# Patient Record
Sex: FEMALE | Race: WHITE
[De-identification: names, ages, dates, MRNs, and addresses within clinical notes are randomized per-mention and may not be internally consistent; named-entity substitution may affect disease eponyms.]

---
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
## 2024-02-08 ENCOUNTER — HOSPITAL ENCOUNTER (EMERGENCY)
Age: 24
Discharge: HOME | End: 2024-02-08
Payer: MEDICAID

## 2024-02-08 VITALS
SYSTOLIC BLOOD PRESSURE: 114 MMHG | TEMPERATURE: 97.9 F | DIASTOLIC BLOOD PRESSURE: 57 MMHG | RESPIRATION RATE: 16 BRPM | HEART RATE: 80 BPM | OXYGEN SATURATION: 100 %

## 2024-02-08 VITALS — BODY MASS INDEX: 21.9 KG/M2

## 2024-02-08 DIAGNOSIS — Z3A.00: ICD-10-CM

## 2024-02-08 DIAGNOSIS — O99.330: ICD-10-CM

## 2024-02-08 DIAGNOSIS — F17.200: ICD-10-CM

## 2024-02-08 DIAGNOSIS — O23.40: Primary | ICD-10-CM

## 2024-02-08 LAB
INTERNAL QC VALIDATED?: (no result)
LEUKOCYTE ESTERASE UR QL STRIP: 25 /UL
MUCOUS THREADS URNS QL MICRO: (no result) /HPF
PROT UR QL STRIP.AUTO: 15 MG/DL
RBC UR QL: (no result) /HPF (ref 0–5)
RBC UR QL: 10 /UL
RECORD KIT LOT#,URINE PREG: (no result)
SP GR UR: 1.02 (ref 1–1.03)
SQUAMOUS URNS QL MICRO: (no result) /HPF (ref 5–10)
URINE PRESERVATIVE: (no result)

## 2024-02-08 PROCEDURE — 99282 EMERGENCY DEPT VISIT SF MDM: CPT

## 2024-02-08 PROCEDURE — 87088 URINE BACTERIA CULTURE: CPT

## 2024-02-08 PROCEDURE — 87186 SC STD MICRODIL/AGAR DIL: CPT

## 2024-02-08 PROCEDURE — 81001 URINALYSIS AUTO W/SCOPE: CPT

## 2024-02-08 PROCEDURE — 81025 URINE PREGNANCY TEST: CPT

## 2024-02-08 PROCEDURE — 87086 URINE CULTURE/COLONY COUNT: CPT

## 2024-04-29 ENCOUNTER — HOSPITAL ENCOUNTER (EMERGENCY)
Age: 24
Discharge: HOME | End: 2024-04-29
Payer: MEDICAID

## 2024-04-29 VITALS
TEMPERATURE: 98 F | DIASTOLIC BLOOD PRESSURE: 78 MMHG | RESPIRATION RATE: 14 BRPM | SYSTOLIC BLOOD PRESSURE: 117 MMHG | HEART RATE: 62 BPM | BODY MASS INDEX: 23.2 KG/M2 | OXYGEN SATURATION: 100 %

## 2024-04-29 VITALS
HEART RATE: 88 BPM | RESPIRATION RATE: 18 BRPM | SYSTOLIC BLOOD PRESSURE: 112 MMHG | DIASTOLIC BLOOD PRESSURE: 68 MMHG | OXYGEN SATURATION: 98 %

## 2024-04-29 VITALS
DIASTOLIC BLOOD PRESSURE: 62 MMHG | RESPIRATION RATE: 16 BRPM | SYSTOLIC BLOOD PRESSURE: 106 MMHG | OXYGEN SATURATION: 97 % | HEART RATE: 100 BPM

## 2024-04-29 DIAGNOSIS — Z87.891: ICD-10-CM

## 2024-04-29 DIAGNOSIS — Z3A.20: ICD-10-CM

## 2024-04-29 DIAGNOSIS — O20.0: Primary | ICD-10-CM

## 2024-04-29 LAB
DEPRECATED RDW RBC: 40.3 FL (ref 35.1–43.9)
ERYTHROCYTE [DISTWIDTH] IN BLOOD: 12.4 % (ref 11.6–14.6)
HCG SERPL QL: 6475 MIU/ML (ref 1–3)
HCT VFR BLD AUTO: 30.6 % (ref 37–47)
HGB BLD-MCNC: 10.1 G/DL (ref 12–15)
IMMATURE GRANULOCYTES COUNT: 0.09 X10^3/UL (ref 0–0)
MCV RBC: 88.7 FL (ref 81–99)
MEAN CORP HGB CONC: 33 G/DL (ref 32–36)
MEAN PLATELET VOL.: 9.2 FL (ref 6.2–12)
MUCOUS THREADS URNS QL MICRO: (no result) /HPF
NRBC FLAGGED BY ANALYZER: 0 % (ref 0–5)
PLATELET # BLD: 279 K/MM3 (ref 150–450)
RBC # BLD AUTO: 3.45 M/MM3 (ref 4.2–5.4)
RBC UR QL: (no result) /HPF (ref 0–5)
RBC UR QL: 25 /UL
SP GR UR: 1 (ref 1–1.03)
SQUAMOUS URNS QL MICRO: (no result) /HPF (ref 5–10)
URINE PRESERVATIVE: (no result)
WBC # BLD AUTO: 10.1 K/MM3 (ref 4.4–11)

## 2024-04-29 PROCEDURE — 76816 OB US FOLLOW-UP PER FETUS: CPT

## 2024-04-29 PROCEDURE — 99282 EMERGENCY DEPT VISIT SF MDM: CPT

## 2024-04-29 PROCEDURE — 81001 URINALYSIS AUTO W/SCOPE: CPT

## 2024-04-29 PROCEDURE — 87186 SC STD MICRODIL/AGAR DIL: CPT

## 2024-04-29 PROCEDURE — 86900 BLOOD TYPING SEROLOGIC ABO: CPT

## 2024-04-29 PROCEDURE — 87088 URINE BACTERIA CULTURE: CPT

## 2024-04-29 PROCEDURE — 85025 COMPLETE CBC W/AUTO DIFF WBC: CPT

## 2024-04-29 PROCEDURE — 87086 URINE CULTURE/COLONY COUNT: CPT

## 2024-04-29 PROCEDURE — 84702 CHORIONIC GONADOTROPIN TEST: CPT

## 2024-04-29 PROCEDURE — A4216 STERILE WATER/SALINE, 10 ML: HCPCS

## 2024-04-29 PROCEDURE — 86901 BLOOD TYPING SEROLOGIC RH(D): CPT

## 2024-07-08 ENCOUNTER — HOSPITAL ENCOUNTER (EMERGENCY)
Facility: HOSPITAL | Age: 24
Discharge: LEFT BEFORE DISCHARGE | End: 2024-07-08
Payer: MEDICAID

## 2024-07-08 VITALS
DIASTOLIC BLOOD PRESSURE: 63 MMHG | SYSTOLIC BLOOD PRESSURE: 103 MMHG | OXYGEN SATURATION: 98 % | RESPIRATION RATE: 16 BRPM | BODY MASS INDEX: 23.04 KG/M2 | WEIGHT: 130 LBS | TEMPERATURE: 97.9 F | HEART RATE: 97 BPM | HEIGHT: 63 IN

## 2024-07-08 DIAGNOSIS — O26.899 VAGINAL DISCHARGE DURING PREGNANCY, ANTEPARTUM (HHS-HCC): Primary | ICD-10-CM

## 2024-07-08 DIAGNOSIS — Z91.199 NONCOMPLIANCE: ICD-10-CM

## 2024-07-08 DIAGNOSIS — Z53.29 LEFT AGAINST MEDICAL ADVICE: ICD-10-CM

## 2024-07-08 DIAGNOSIS — N89.8 VAGINAL DISCHARGE DURING PREGNANCY, ANTEPARTUM (HHS-HCC): Primary | ICD-10-CM

## 2024-07-08 LAB
ALBUMIN SERPL BCP-MCNC: 3 G/DL (ref 3.4–5)
ALP SERPL-CCNC: 145 U/L (ref 33–110)
ALT SERPL W P-5'-P-CCNC: 16 U/L (ref 7–45)
ANION GAP SERPL CALC-SCNC: 11 MMOL/L (ref 10–20)
AST SERPL W P-5'-P-CCNC: 15 U/L (ref 9–39)
BASOPHILS # BLD AUTO: 0.04 X10*3/UL (ref 0–0.1)
BASOPHILS NFR BLD AUTO: 0.4 %
BILIRUB SERPL-MCNC: 0.4 MG/DL (ref 0–1.2)
BUN SERPL-MCNC: 13 MG/DL (ref 6–23)
CALCIUM SERPL-MCNC: 8.3 MG/DL (ref 8.6–10.3)
CHLORIDE SERPL-SCNC: 103 MMOL/L (ref 98–107)
CO2 SERPL-SCNC: 26 MMOL/L (ref 21–32)
CREAT SERPL-MCNC: 0.7 MG/DL (ref 0.5–1.05)
EGFRCR SERPLBLD CKD-EPI 2021: >90 ML/MIN/1.73M*2
EOSINOPHIL # BLD AUTO: 0.08 X10*3/UL (ref 0–0.7)
EOSINOPHIL NFR BLD AUTO: 0.8 %
ERYTHROCYTE [DISTWIDTH] IN BLOOD BY AUTOMATED COUNT: 13.8 % (ref 11.5–14.5)
GLUCOSE SERPL-MCNC: 84 MG/DL (ref 74–99)
HCT VFR BLD AUTO: 28.5 % (ref 36–46)
HGB BLD-MCNC: 9.1 G/DL (ref 12–16)
IMM GRANULOCYTES # BLD AUTO: 0.08 X10*3/UL (ref 0–0.7)
IMM GRANULOCYTES NFR BLD AUTO: 0.8 % (ref 0–0.9)
LYMPHOCYTES # BLD AUTO: 1.24 X10*3/UL (ref 1.2–4.8)
LYMPHOCYTES NFR BLD AUTO: 12.7 %
MCH RBC QN AUTO: 25.9 PG (ref 26–34)
MCHC RBC AUTO-ENTMCNC: 31.9 G/DL (ref 32–36)
MCV RBC AUTO: 81 FL (ref 80–100)
MONOCYTES # BLD AUTO: 0.47 X10*3/UL (ref 0.1–1)
MONOCYTES NFR BLD AUTO: 4.8 %
NEUTROPHILS # BLD AUTO: 7.85 X10*3/UL (ref 1.2–7.7)
NEUTROPHILS NFR BLD AUTO: 80.5 %
NRBC BLD-RTO: 0 /100 WBCS (ref 0–0)
PLATELET # BLD AUTO: 284 X10*3/UL (ref 150–450)
POTASSIUM SERPL-SCNC: 3.7 MMOL/L (ref 3.5–5.3)
PROT SERPL-MCNC: 5.9 G/DL (ref 6.4–8.2)
RBC # BLD AUTO: 3.52 X10*6/UL (ref 4–5.2)
SODIUM SERPL-SCNC: 136 MMOL/L (ref 136–145)
WBC # BLD AUTO: 9.8 X10*3/UL (ref 4.4–11.3)

## 2024-07-08 PROCEDURE — 80053 COMPREHEN METABOLIC PANEL: CPT | Performed by: PHYSICIAN ASSISTANT

## 2024-07-08 PROCEDURE — 36415 COLL VENOUS BLD VENIPUNCTURE: CPT | Performed by: PHYSICIAN ASSISTANT

## 2024-07-08 PROCEDURE — 85025 COMPLETE CBC W/AUTO DIFF WBC: CPT | Performed by: PHYSICIAN ASSISTANT

## 2024-07-08 PROCEDURE — 99283 EMERGENCY DEPT VISIT LOW MDM: CPT

## 2024-07-08 ASSESSMENT — ENCOUNTER SYMPTOMS
FEVER: 0
CHILLS: 0
SHORTNESS OF BREATH: 0
ARTHRALGIAS: 0
DYSURIA: 0
EYE PAIN: 0
COUGH: 0
ABDOMINAL PAIN: 0
VOMITING: 0
SORE THROAT: 0
HEMATURIA: 0
COLOR CHANGE: 0
SEIZURES: 0
BACK PAIN: 0
PALPITATIONS: 0

## 2024-07-08 ASSESSMENT — COLUMBIA-SUICIDE SEVERITY RATING SCALE - C-SSRS
6. HAVE YOU EVER DONE ANYTHING, STARTED TO DO ANYTHING, OR PREPARED TO DO ANYTHING TO END YOUR LIFE?: NO
2. HAVE YOU ACTUALLY HAD ANY THOUGHTS OF KILLING YOURSELF?: NO
1. IN THE PAST MONTH, HAVE YOU WISHED YOU WERE DEAD OR WISHED YOU COULD GO TO SLEEP AND NOT WAKE UP?: NO

## 2024-07-08 NOTE — ED PROVIDER NOTES
Patient is a 24-year-old female who presents to the emergency room with a pregnancy issue.  Patient reports that she is pregnant.  She does not know her estimated date of delivery or how many weeks she is.  She states that her last menstrual cycle was in December at some point.  She was unable to provide a date.  She states that she had 2 OB appointments but missed both of her appointments as she did not have a car.  She reports that 3 days ago she developed lower abdominal pain, had a blood clot and has since then has been leaking fluid that she reports is clear.  She denies any additional bleeding or abdominal pain since the initial blood clot. This is her 4th pregnancy. No fever or chills. Denies trauma.           Review of Systems   Constitutional:  Negative for chills and fever.   HENT:  Negative for ear pain and sore throat.    Eyes:  Negative for pain and visual disturbance.   Respiratory:  Negative for cough and shortness of breath.    Cardiovascular:  Negative for chest pain and palpitations.   Gastrointestinal:  Negative for abdominal pain and vomiting.   Genitourinary:  Positive for vaginal discharge. Negative for dysuria, hematuria and vaginal bleeding.   Musculoskeletal:  Negative for arthralgias and back pain.   Skin:  Negative for color change and rash.   Neurological:  Negative for seizures and syncope.   All other systems reviewed and are negative.       Physical Exam  Vitals and nursing note reviewed.   Constitutional:       General: She is not in acute distress.     Appearance: Normal appearance. She is well-developed.   HENT:      Head: Normocephalic and atraumatic.   Eyes:      Extraocular Movements: Extraocular movements intact.      Conjunctiva/sclera: Conjunctivae normal.   Cardiovascular:      Rate and Rhythm: Normal rate and regular rhythm.      Heart sounds: No murmur heard.  Pulmonary:      Effort: Pulmonary effort is normal. No respiratory distress.      Breath sounds: Normal breath  sounds. No stridor. No wheezing or rhonchi.   Abdominal:      General: There is distension.      Palpations: Abdomen is soft. There is no mass.      Tenderness: There is no abdominal tenderness. There is no guarding or rebound.      Hernia: No hernia is present.   Genitourinary:     Vagina: Vaginal discharge (white milky discharge) present.      Comments: Cervical os is closed  Musculoskeletal:         General: No swelling.      Cervical back: Normal range of motion and neck supple. No rigidity.   Skin:     General: Skin is warm and dry.      Capillary Refill: Capillary refill takes less than 2 seconds.   Neurological:      Mental Status: She is alert.   Psychiatric:         Mood and Affect: Mood normal.          Labs Reviewed   CBC WITH AUTO DIFFERENTIAL - Abnormal       Result Value    WBC 9.8      nRBC 0.0      RBC 3.52 (*)     Hemoglobin 9.1 (*)     Hematocrit 28.5 (*)     MCV 81      MCH 25.9 (*)     MCHC 31.9 (*)     RDW 13.8      Platelets 284      Neutrophils % 80.5      Immature Granulocytes %, Automated 0.8      Lymphocytes % 12.7      Monocytes % 4.8      Eosinophils % 0.8      Basophils % 0.4      Neutrophils Absolute 7.85 (*)     Immature Granulocytes Absolute, Automated 0.08      Lymphocytes Absolute 1.24      Monocytes Absolute 0.47      Eosinophils Absolute 0.08      Basophils Absolute 0.04     COMPREHENSIVE METABOLIC PANEL - Abnormal    Glucose 84      Sodium 136      Potassium 3.7      Chloride 103      Bicarbonate 26      Anion Gap 11      Urea Nitrogen 13      Creatinine 0.70      eGFR >90      Calcium 8.3 (*)     Albumin 3.0 (*)     Alkaline Phosphatase 145 (*)     Total Protein 5.9 (*)     AST 15      Bilirubin, Total 0.4      ALT 16          No orders to display        Procedures     Medical Decision Making  Patient is a 24-year-old female who presents to the emergency room with vaginal discharge in pregnancy.  Patient is a very poor historian.  States that her last menstrual cycle was in  December, does not know how far along she is in her pregnancy.  She has not received any prenatal care.  Reports that she was scheduled to see OB on 2 different occasions but was unable to make her appointment secondary to not having a vehicle.  She does report recently that she was seen at [facility] in which she had an ultrasound.  I did review the patient's previous encounters through epic and I do not appreciate any recent visits or any record of any ultrasounds.  Pelvic examination was performed and there is a white milky discharge noted in her vaginal vault.  Cervical os is closed.  No appreciated vaginal bleeding.  She denies any discharge.  Her report of vaginal bleeding happened 3 days ago in which she states that she has had discharge since then that she states is clear.  I do not have the capability to test for any amniotic fluid.  Fetal heart tones were 168.  Given the estimated date of delivery based on patient's last known menstrual cycle she is greater than 20 weeks gestation.  Given that she has had no prenatal care and is an extremely poor historian with report of bleeding and a brief episode of abdominal pain that has resolved 3 days ago with leakage of clear fluid she is recommended for further evaluation by obstetrics.  We do not have obstetrics at this hospital.  I spoke with [facility] as we do not have would be at this facility.  I spoke with obstetrician, Dr. Sanchez.  He has accepted the patient for transfer.  Will go through the emergency room and will then go up to OB.  I discussed this with the patient as the nurse was drawing blood and starting an IV.  Patient is recommended for transfer via squad as she has received no prenatal care, seems somewhat unreliable to get herself to [facility].  This was discussed with patient that she was excepted at [place], she would be transferred via squad.  Patient was originally agreeable and then further changed her  mind.  She first asked if she would be able to have a cigarette before she left which I told her no.  She then states that her phone was dying and she would like to leave as she was scared and would like to go home and get her mother.  She asked if we thought she should go to [place] and I responded that yes there was a concern and she needed further evaluated.  Explained that given no prenatal care and fetus needed further evaluation with OB.  Patient stated that she wanted to go home.  She signed out AGAINST MEDICAL ADVICE.  Discussed the risk of signing out AGAINST MEDICAL ADVICE including worsening condition, disability, pregnancy related issue, death.  Patient verbalized understanding.  Strongly encourage patient to present to a facility with OB as soon as possible.         Diagnoses as of 07/08/24 2220   Vaginal discharge during pregnancy, antepartum ([facility])   Noncompliance   Left against medical advice                    Funmilayo Celaya PA-C  07/08/24 2220

## 2024-07-14 ENCOUNTER — HOSPITAL ENCOUNTER (INPATIENT)
Dept: HOSPITAL 100 - WPOUT | Age: 24
LOS: 2 days | Discharge: HOME | DRG: 540 | End: 2024-07-16
Payer: MEDICAID

## 2024-07-14 ENCOUNTER — TELEPHONE (OUTPATIENT)
Dept: EMERGENCY MEDICINE | Facility: HOSPITAL | Age: 24
End: 2024-07-14

## 2024-07-14 VITALS
TEMPERATURE: 96.4 F | SYSTOLIC BLOOD PRESSURE: 116 MMHG | DIASTOLIC BLOOD PRESSURE: 71 MMHG | OXYGEN SATURATION: 100 % | HEART RATE: 92 BPM | RESPIRATION RATE: 16 BRPM

## 2024-07-14 VITALS
RESPIRATION RATE: 15 BRPM | OXYGEN SATURATION: 100 % | HEART RATE: 91 BPM | DIASTOLIC BLOOD PRESSURE: 72 MMHG | SYSTOLIC BLOOD PRESSURE: 116 MMHG

## 2024-07-14 VITALS
DIASTOLIC BLOOD PRESSURE: 67 MMHG | SYSTOLIC BLOOD PRESSURE: 107 MMHG | OXYGEN SATURATION: 99 % | RESPIRATION RATE: 14 BRPM | TEMPERATURE: 97.16 F | HEART RATE: 82 BPM

## 2024-07-14 VITALS
OXYGEN SATURATION: 100 % | HEART RATE: 90 BPM | SYSTOLIC BLOOD PRESSURE: 110 MMHG | DIASTOLIC BLOOD PRESSURE: 69 MMHG | RESPIRATION RATE: 16 BRPM | TEMPERATURE: 97.16 F

## 2024-07-14 VITALS
DIASTOLIC BLOOD PRESSURE: 81 MMHG | SYSTOLIC BLOOD PRESSURE: 122 MMHG | RESPIRATION RATE: 16 BRPM | TEMPERATURE: 97.52 F | HEART RATE: 92 BPM | OXYGEN SATURATION: 99 %

## 2024-07-14 VITALS
SYSTOLIC BLOOD PRESSURE: 108 MMHG | OXYGEN SATURATION: 100 % | TEMPERATURE: 98.06 F | HEART RATE: 98 BPM | RESPIRATION RATE: 16 BRPM | DIASTOLIC BLOOD PRESSURE: 68 MMHG

## 2024-07-14 VITALS
TEMPERATURE: 97.88 F | HEART RATE: 92 BPM | OXYGEN SATURATION: 98 % | DIASTOLIC BLOOD PRESSURE: 56 MMHG | RESPIRATION RATE: 15 BRPM | SYSTOLIC BLOOD PRESSURE: 103 MMHG

## 2024-07-14 VITALS
SYSTOLIC BLOOD PRESSURE: 99 MMHG | TEMPERATURE: 97.88 F | DIASTOLIC BLOOD PRESSURE: 65 MMHG | RESPIRATION RATE: 16 BRPM | OXYGEN SATURATION: 100 % | HEART RATE: 90 BPM

## 2024-07-14 VITALS — HEART RATE: 97 BPM | OXYGEN SATURATION: 100 %

## 2024-07-14 VITALS
SYSTOLIC BLOOD PRESSURE: 118 MMHG | HEART RATE: 98 BPM | OXYGEN SATURATION: 98 % | TEMPERATURE: 97.9 F | RESPIRATION RATE: 15 BRPM | DIASTOLIC BLOOD PRESSURE: 60 MMHG

## 2024-07-14 VITALS
DIASTOLIC BLOOD PRESSURE: 75 MMHG | SYSTOLIC BLOOD PRESSURE: 128 MMHG | RESPIRATION RATE: 15 BRPM | HEART RATE: 91 BPM | OXYGEN SATURATION: 100 %

## 2024-07-14 VITALS — OXYGEN SATURATION: 100 % | HEART RATE: 97 BPM

## 2024-07-14 VITALS
OXYGEN SATURATION: 100 % | DIASTOLIC BLOOD PRESSURE: 62 MMHG | HEART RATE: 90 BPM | RESPIRATION RATE: 15 BRPM | TEMPERATURE: 98 F | SYSTOLIC BLOOD PRESSURE: 108 MMHG

## 2024-07-14 VITALS
DIASTOLIC BLOOD PRESSURE: 75 MMHG | RESPIRATION RATE: 16 BRPM | TEMPERATURE: 96.62 F | SYSTOLIC BLOOD PRESSURE: 109 MMHG | HEART RATE: 86 BPM | OXYGEN SATURATION: 99 %

## 2024-07-14 VITALS
RESPIRATION RATE: 15 BRPM | DIASTOLIC BLOOD PRESSURE: 66 MMHG | TEMPERATURE: 96.1 F | HEART RATE: 81 BPM | SYSTOLIC BLOOD PRESSURE: 115 MMHG | OXYGEN SATURATION: 100 %

## 2024-07-14 VITALS
HEART RATE: 88 BPM | OXYGEN SATURATION: 97 % | TEMPERATURE: 97.52 F | RESPIRATION RATE: 16 BRPM | SYSTOLIC BLOOD PRESSURE: 109 MMHG | DIASTOLIC BLOOD PRESSURE: 55 MMHG

## 2024-07-14 VITALS — HEART RATE: 89 BPM | OXYGEN SATURATION: 100 %

## 2024-07-14 VITALS — HEART RATE: 102 BPM | OXYGEN SATURATION: 91 %

## 2024-07-14 DIAGNOSIS — Z3A.31: ICD-10-CM

## 2024-07-14 DIAGNOSIS — Z3A.28: ICD-10-CM

## 2024-07-14 DIAGNOSIS — F15.90: ICD-10-CM

## 2024-07-14 DIAGNOSIS — Z37.1: ICD-10-CM

## 2024-07-14 DIAGNOSIS — D62: ICD-10-CM

## 2024-07-14 DIAGNOSIS — O36.4XX0: ICD-10-CM

## 2024-07-14 LAB
AMPHET UR-SCNC: POSITIVE UMOL/L
BARBITURATE URINE VISTA: NEGATIVE
BENZODIAZEPINE URINE VISTA: NEGATIVE
COCAINE URINE VISTA: NEGATIVE
DEPRECATED RDW RBC: 40.9 FL (ref 35.1–43.9)
DIFFERENTIAL COMMENT: (no result)
DIFFERENTIAL INDICATED: (no result)
DRUG CONFIRMATION TO FOLLOW?: (no result)
ECSTACY URINE VISTA: POSITIVE
ERYTHROCYTE [DISTWIDTH] IN BLOOD: 14.1 % (ref 11.6–14.6)
HCT VFR BLD AUTO: 27.8 % (ref 37–47)
HEMOGLOBIN: 8.7 G/DL (ref 12–15)
HGB BLD-MCNC: 8.7 G/DL (ref 12–15)
IMMATURE GRANULOCYTES COUNT: 0.12 X10^3/UL (ref 0–0)
LEUKOCYTE ESTERASE UR QL STRIP: 25 /UL
MANUAL DIF COMMENT BLD-IMP: (no result)
MCV RBC: 80.1 FL (ref 81–99)
MEAN CORP HGB CONC: 31.3 G/DL (ref 32–36)
MEAN PLATELET VOL.: 9.5 FL (ref 6.2–12)
METHADONE URINE VISTA: NEGATIVE
MUCOUS THREADS URNS QL MICRO: (no result) /HPF
NRBC FLAGGED BY ANALYZER: 0 % (ref 0–5)
PCP UR QL: NEGATIVE
PH UR: 6 [PH]
PLATELET # BLD: 332 K/MM3 (ref 150–450)
PLATELET COUNT: 332 K/MM3 (ref 150–450)
POSITIVE DIFFERENTIAL: YES
PROT UR QL STRIP.AUTO: 30 MG/DL
RBC # BLD AUTO: 3.47 M/MM3 (ref 4.2–5.4)
RBC DISTRIBUTION WIDTH CV: 14.1 % (ref 11.6–14.6)
RBC DISTRIBUTION WIDTH SD: 40.9 FL (ref 35.1–43.9)
RBC UR QL: (no result) /HPF (ref 0–5)
RBC UR QL: 25 /UL
SCAN SMEAR PER REVIEW CRITERIA: (no result)
SP GR UR: 1.01 (ref 1–1.03)
SQUAMOUS URNS QL MICRO: (no result) /HPF (ref 5–10)
THC URINE VISTA: POSITIVE
URINE PRESERVATIVE: (no result)
WBC # BLD AUTO: 22.5 K/MM3 (ref 4.4–11)
WHITE BLOOD COUNT: 22.5 K/MM3 (ref 4.4–11)

## 2024-07-14 PROCEDURE — 86901 BLOOD TYPING SEROLOGIC RH(D): CPT

## 2024-07-14 PROCEDURE — 86780 TREPONEMA PALLIDUM: CPT

## 2024-07-14 PROCEDURE — 85027 COMPLETE CBC AUTOMATED: CPT

## 2024-07-14 PROCEDURE — 86900 BLOOD TYPING SEROLOGIC ABO: CPT

## 2024-07-14 PROCEDURE — 86762 RUBELLA ANTIBODY: CPT

## 2024-07-14 PROCEDURE — 87491 CHLMYD TRACH DNA AMP PROBE: CPT

## 2024-07-14 PROCEDURE — 99221 1ST HOSP IP/OBS SF/LOW 40: CPT

## 2024-07-14 PROCEDURE — 87340 HEPATITIS B SURFACE AG IA: CPT

## 2024-07-14 PROCEDURE — 85025 COMPLETE CBC W/AUTO DIFF WBC: CPT

## 2024-07-14 PROCEDURE — 87591 N.GONORRHOEAE DNA AMP PROB: CPT

## 2024-07-14 PROCEDURE — 81001 URINALYSIS AUTO W/SCOPE: CPT

## 2024-07-14 PROCEDURE — 86703 HIV-1/HIV-2 1 RESULT ANTBDY: CPT

## 2024-07-14 PROCEDURE — A4216 STERILE WATER/SALINE, 10 ML: HCPCS

## 2024-07-14 PROCEDURE — 88307 TISSUE EXAM BY PATHOLOGIST: CPT

## 2024-07-14 PROCEDURE — 86803 HEPATITIS C AB TEST: CPT

## 2024-07-14 PROCEDURE — 86850 RBC ANTIBODY SCREEN: CPT

## 2024-07-14 PROCEDURE — 80307 DRUG TEST PRSMV CHEM ANLYZR: CPT

## 2024-07-14 PROCEDURE — G0378 HOSPITAL OBSERVATION PER HR: HCPCS

## 2024-07-14 RX ADMIN — DOCUSATE SODIUM 50 MG AND SENNOSIDES 8.6 MG 0 TABLET: 8.6; 5 TABLET, FILM COATED ORAL at 12:14

## 2024-07-14 RX ADMIN — KETOROLAC TROMETHAMINE 30 MG: 30 INJECTION, SOLUTION INTRAMUSCULAR; INTRAVENOUS at 09:50

## 2024-07-14 RX ADMIN — KETOROLAC TROMETHAMINE 30 MG: 30 INJECTION, SOLUTION INTRAMUSCULAR; INTRAVENOUS at 21:49

## 2024-07-14 RX ADMIN — OXYTOCIN 83 UNITS: 10 INJECTION, SOLUTION INTRAMUSCULAR; INTRAVENOUS at 09:00

## 2024-07-14 RX ADMIN — SODIUM CHLORIDE, PRESERVATIVE FREE 0 ML: 5 INJECTION INTRAVENOUS at 09:52

## 2024-07-14 RX ADMIN — KETOROLAC TROMETHAMINE 30 MG: 30 INJECTION, SOLUTION INTRAMUSCULAR; INTRAVENOUS at 15:56

## 2024-07-14 RX ADMIN — CEFAZOLIN SODIUM 150 GM: 1 INJECTION, SOLUTION INTRAVENOUS at 15:44

## 2024-07-14 RX ADMIN — SODIUM CHLORIDE, PRESERVATIVE FREE 0 ML: 5 INJECTION INTRAVENOUS at 21:48

## 2024-07-15 VITALS
SYSTOLIC BLOOD PRESSURE: 93 MMHG | OXYGEN SATURATION: 98 % | RESPIRATION RATE: 18 BRPM | DIASTOLIC BLOOD PRESSURE: 84 MMHG | HEART RATE: 86 BPM | TEMPERATURE: 97.88 F

## 2024-07-15 VITALS
OXYGEN SATURATION: 98 % | RESPIRATION RATE: 15 BRPM | TEMPERATURE: 97 F | SYSTOLIC BLOOD PRESSURE: 108 MMHG | DIASTOLIC BLOOD PRESSURE: 58 MMHG | HEART RATE: 73 BPM

## 2024-07-15 VITALS
DIASTOLIC BLOOD PRESSURE: 66 MMHG | HEART RATE: 80 BPM | TEMPERATURE: 98 F | RESPIRATION RATE: 17 BRPM | SYSTOLIC BLOOD PRESSURE: 107 MMHG

## 2024-07-15 VITALS
SYSTOLIC BLOOD PRESSURE: 105 MMHG | HEART RATE: 84 BPM | TEMPERATURE: 98.9 F | DIASTOLIC BLOOD PRESSURE: 52 MMHG | OXYGEN SATURATION: 98 % | RESPIRATION RATE: 16 BRPM

## 2024-07-15 VITALS
OXYGEN SATURATION: 100 % | DIASTOLIC BLOOD PRESSURE: 58 MMHG | HEART RATE: 88 BPM | SYSTOLIC BLOOD PRESSURE: 100 MMHG | TEMPERATURE: 97.34 F | RESPIRATION RATE: 18 BRPM

## 2024-07-15 LAB
DEPRECATED RDW RBC: 41.1 FL (ref 35.1–43.9)
ERYTHROCYTE [DISTWIDTH] IN BLOOD: 14 % (ref 11.6–14.6)
HCT VFR BLD AUTO: 20.9 % (ref 37–47)
HEMOGLOBIN: 6.6 G/DL (ref 12–15)
HGB BLD-MCNC: 6.6 G/DL (ref 12–15)
MCV RBC: 81 FL (ref 81–99)
MEAN CORP HGB CONC: 31.6 G/DL (ref 32–36)
MEAN PLATELET VOL.: 9.5 FL (ref 6.2–12)
PLATELET # BLD: 240 K/MM3 (ref 150–450)
PLATELET COUNT: 240 K/MM3 (ref 150–450)
RBC # BLD AUTO: 2.58 M/MM3 (ref 4.2–5.4)
RBC DISTRIBUTION WIDTH CV: 14 % (ref 11.6–14.6)
RBC DISTRIBUTION WIDTH SD: 41.1 FL (ref 35.1–43.9)
WBC # BLD AUTO: 16.5 K/MM3 (ref 4.4–11)
WHITE BLOOD COUNT: 16.5 K/MM3 (ref 4.4–11)

## 2024-07-15 RX ADMIN — KETOROLAC TROMETHAMINE 30 MG: 30 INJECTION, SOLUTION INTRAMUSCULAR; INTRAVENOUS at 04:01

## 2024-07-15 RX ADMIN — SODIUM CHLORIDE, PRESERVATIVE FREE 0 ML: 5 INJECTION INTRAVENOUS at 16:28

## 2024-07-15 RX ADMIN — SODIUM CHLORIDE, PRESERVATIVE FREE 0 ML: 5 INJECTION INTRAVENOUS at 21:07

## 2024-07-15 RX ADMIN — CEFAZOLIN SODIUM 150 GM: 1 INJECTION, SOLUTION INTRAVENOUS at 00:19

## 2024-07-15 RX ADMIN — SODIUM CHLORIDE, PRESERVATIVE FREE 0 ML: 5 INJECTION INTRAVENOUS at 04:01

## 2024-07-15 RX ADMIN — SODIUM CHLORIDE, PRESERVATIVE FREE 0 ML: 5 INJECTION INTRAVENOUS at 02:19

## 2024-07-15 RX ADMIN — DOCUSATE SODIUM 50 MG AND SENNOSIDES 8.6 MG 0 TABLET: 8.6; 5 TABLET, FILM COATED ORAL at 09:33

## 2024-07-16 VITALS
RESPIRATION RATE: 16 BRPM | TEMPERATURE: 97.88 F | SYSTOLIC BLOOD PRESSURE: 102 MMHG | OXYGEN SATURATION: 97 % | HEART RATE: 89 BPM | DIASTOLIC BLOOD PRESSURE: 58 MMHG

## 2024-07-16 VITALS
OXYGEN SATURATION: 98 % | HEART RATE: 76 BPM | TEMPERATURE: 97.88 F | DIASTOLIC BLOOD PRESSURE: 61 MMHG | SYSTOLIC BLOOD PRESSURE: 96 MMHG | RESPIRATION RATE: 24 BRPM

## 2024-07-16 LAB
DEPRECATED RDW RBC: 42.2 FL (ref 35.1–43.9)
ERYTHROCYTE [DISTWIDTH] IN BLOOD: 14.5 % (ref 11.6–14.6)
HCT VFR BLD AUTO: 21.2 % (ref 37–47)
HEMOGLOBIN: 6.6 G/DL (ref 12–15)
HGB BLD-MCNC: 6.6 G/DL (ref 12–15)
MCV RBC: 80.3 FL (ref 81–99)
MEAN CORP HGB CONC: 31.1 G/DL (ref 32–36)
MEAN PLATELET VOL.: 9.8 FL (ref 6.2–12)
PLATELET # BLD: 238 K/MM3 (ref 150–450)
PLATELET COUNT: 238 K/MM3 (ref 150–450)
RBC # BLD AUTO: 2.64 M/MM3 (ref 4.2–5.4)
RBC DISTRIBUTION WIDTH CV: 14.5 % (ref 11.6–14.6)
RBC DISTRIBUTION WIDTH SD: 42.2 FL (ref 35.1–43.9)
WBC # BLD AUTO: 13.6 K/MM3 (ref 4.4–11)
WHITE BLOOD COUNT: 13.6 K/MM3 (ref 4.4–11)

## 2024-07-16 RX ADMIN — DOCUSATE SODIUM 50 MG AND SENNOSIDES 8.6 MG 0 TABLET: 8.6; 5 TABLET, FILM COATED ORAL at 10:00

## 2024-07-17 LAB — PATHOLOGY SPECIMEN OB: (no result)
